# Patient Record
Sex: FEMALE | Race: WHITE | NOT HISPANIC OR LATINO | URBAN - METROPOLITAN AREA
[De-identification: names, ages, dates, MRNs, and addresses within clinical notes are randomized per-mention and may not be internally consistent; named-entity substitution may affect disease eponyms.]

---

## 2018-09-02 ENCOUNTER — COMMUNICATION - HEALTHEAST (OUTPATIENT)
Dept: SCHEDULING | Facility: CLINIC | Age: 18
End: 2018-09-02

## 2025-04-09 ENCOUNTER — TRANSCRIBE ORDERS (OUTPATIENT)
Dept: OTHER | Age: 25
End: 2025-04-09

## 2025-04-09 DIAGNOSIS — G35 MS (MULTIPLE SCLEROSIS) (H): Primary | ICD-10-CM

## 2025-04-10 NOTE — TELEPHONE ENCOUNTER
Action 4/10/25 MV 11.28am   Action Taken Imaging request faxed to [facility]       RECORDS RECEIVED FROM: external   REASON FOR VISIT: MS   PROVIDER: Dr. Short   DATE OF APPT: 7/22/25   NOTES (FOR ALL VISITS) STATUS DETAILS   OFFICE NOTE from referring provider Care Everywhere Dr Quin Karimi @ [facility]:  12/24/24  11/5/24  7/3/24  (Additional encounters)   OFFICE NOTE from other specialist Care Everywhere Dr Octavia Hamilton @ [facility]:  8/24/23  5/3/22  (Additional encounters)   MEDICATION LIST Care Everywhere    IMAGING  (FOR ALL VISITS)     MRI (HEAD, NECK, SPINE) In process [facility]:  MRI Cervical Spine 1/9/25  MRI Brain 1/9/25  MRI Thoracic Spine 1/9/25  MRI Cervical Spine 12/15/23  MRI Thoracic Spine 12/15/23  MRI Brain 12/15/23  MRI Brain 12/5/22  MRI Cervical Spine 12/5/22

## 2025-07-22 ENCOUNTER — PRE VISIT (OUTPATIENT)
Dept: NEUROLOGY | Facility: CLINIC | Age: 25
End: 2025-07-22
Payer: COMMERCIAL